# Patient Record
Sex: MALE | Race: AMERICAN INDIAN OR ALASKA NATIVE
[De-identification: names, ages, dates, MRNs, and addresses within clinical notes are randomized per-mention and may not be internally consistent; named-entity substitution may affect disease eponyms.]

---

## 2018-01-05 ENCOUNTER — HOSPITAL ENCOUNTER (EMERGENCY)
Dept: HOSPITAL 5 - ED | Age: 68
LOS: 1 days | Discharge: HOME | End: 2018-01-06
Payer: MEDICARE
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
## 2018-01-05 DIAGNOSIS — S06.0X9A: Primary | ICD-10-CM
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
## 2018-01-05 DIAGNOSIS — I10: ICD-10-CM
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
## 2018-01-05 DIAGNOSIS — S06.5X9A: ICD-10-CM

## 2018-01-05 LAB
BASOPHILS # (AUTO): 0 K/MM3 (ref 0–0.1)
BASOPHILS NFR BLD AUTO: 0.3 % (ref 0–1.8)
BILIRUB UR QL STRIP: (no result)
BLOOD UR QL VISUAL: (no result)
BUN SERPL-MCNC: 26 MG/DL (ref 9–20)
BUN/CREAT SERPL: 24 %
CALCIUM SERPL-MCNC: 9.1 MG/DL (ref 8.4–10.2)
EOSINOPHIL # BLD AUTO: 0 K/MM3 (ref 0–0.4)
EOSINOPHIL NFR BLD AUTO: 0 % (ref 0–4.3)
HCT VFR BLD CALC: 40.1 % (ref 35.5–45.6)
HEMOLYSIS INDEX: 32
HGB BLD-MCNC: 12.9 GM/DL (ref 11.8–15.2)
LYMPHOCYTES # BLD AUTO: 1 K/MM3 (ref 1.2–5.4)
LYMPHOCYTES NFR BLD AUTO: 8.7 % (ref 13.4–35)
MCH RBC QN AUTO: 27 PG (ref 28–32)
MCHC RBC AUTO-ENTMCNC: 32 % (ref 32–34)
MCV RBC AUTO: 85 FL (ref 84–94)
MONOCYTES # (AUTO): 0.7 K/MM3 (ref 0–0.8)
MONOCYTES % (AUTO): 5.5 % (ref 0–7.3)
NITRITE UR QL STRIP: (no result)
PH UR STRIP: 6 [PH] (ref 5–7)
PLATELET # BLD: 201 K/MM3 (ref 140–440)
PROT UR STRIP-MCNC: (no result) MG/DL
RBC # BLD AUTO: 4.74 M/MM3 (ref 3.65–5.03)
RBC #/AREA URNS HPF: 1 /HPF (ref 0–6)
UROBILINOGEN UR-MCNC: < 2 MG/DL (ref ?–2)
WBC #/AREA URNS HPF: < 1 /HPF (ref 0–6)

## 2018-01-05 PROCEDURE — 70450 CT HEAD/BRAIN W/O DYE: CPT

## 2018-01-05 PROCEDURE — 72040 X-RAY EXAM NECK SPINE 2-3 VW: CPT

## 2018-01-05 PROCEDURE — 96375 TX/PRO/DX INJ NEW DRUG ADDON: CPT

## 2018-01-05 PROCEDURE — 85025 COMPLETE CBC W/AUTO DIFF WBC: CPT

## 2018-01-05 PROCEDURE — 93010 ELECTROCARDIOGRAM REPORT: CPT

## 2018-01-05 PROCEDURE — 81001 URINALYSIS AUTO W/SCOPE: CPT

## 2018-01-05 PROCEDURE — 99285 EMERGENCY DEPT VISIT HI MDM: CPT

## 2018-01-05 PROCEDURE — 96365 THER/PROPH/DIAG IV INF INIT: CPT

## 2018-01-05 PROCEDURE — 93005 ELECTROCARDIOGRAM TRACING: CPT

## 2018-01-05 PROCEDURE — 36415 COLL VENOUS BLD VENIPUNCTURE: CPT

## 2018-01-05 PROCEDURE — 82962 GLUCOSE BLOOD TEST: CPT

## 2018-01-05 PROCEDURE — 80048 BASIC METABOLIC PNL TOTAL CA: CPT

## 2018-01-05 NOTE — CAT SCAN REPORT
FINAL REPORT



PROCEDURE:  CT HEAD/BRAIN WO CON



TECHNIQUE:  Computerized tomography of the head was performed

without contrast material. 



HISTORY:  head injury 



COMPARISON:  No prior studies are available for comparison.



FINDINGS:  

There is extensive bilateral periventricular white matter low

attenuation, which may be related to chronic microvascular

ischemic changes. There are chronic appearing bilateral thalamic

lacunar infarcts. There is no CT evidence of intracranial mass,

acute territorial infarction, or hydrocephalus. The intracranial

arteries are symmetric in density. 



There is mild asymmetric increased density along the right

tentorium, which may be incidental. If there has been significant

head trauma, trace subdural hemorrhage could have this

appearance. No mass effect. 



IMPRESSION:  

Questionable trace subdural hemorrhage along the right tentorium,

if there has been acute head trauma. This may be artifactual or

related to chronic asymmetric appearance. Follow-up CT could be

obtained as indicated. Findings were discussed with SAEED Avitia by telephone at 9:15 p.m.   central standard time on

01/05/2018 



Extensive chronic microvascular ischemic changes.

## 2018-01-05 NOTE — XRAY REPORT
FINAL REPORT



PROCEDURE:  XR SPINE CERVICAL 2-3V



TECHNIQUE:  Cervical spine complete, including AP, lateral,

open-mouth odontoid, oblique and flexion and extension studies.

CPT 81144







HISTORY:  fall 



COMPARISON:  No prior studies are available for comparison.



FINDINGS:  

Prevertebral soft tissues: Normal .



Alignment in neutral position: Normal .



Vertebral body movement with flexion and extension: Physiologic .



Vertebral body heights/Disk spaces: There are multilevel

degenerative disc changes with ventral osteophytic ridging..



Fracture(s): None .



Neural foramina: Normal .



Facets: Normal .



Bone mineralization: Normal .







IMPRESSION:  

There is no fracture or malalignment.

## 2018-01-05 NOTE — EMERGENCY DEPARTMENT REPORT
ED Syncope HPI





- General


Chief Complaint: Syncope


Stated Complaint: SYNCOPE


Time Seen by Provider: 01/05/18 22:35


Source: patient, family


Exam Limitations: no limitations





- History of Present Illness


Initial Comments: 





Patient is a 67-year-old Afro-American male who is presenting status post 

closed head injury.  Patient's family states that he has issues with his 

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 

left-sided weakness.  Patient was walking up some steps and slipped and fell 

backwards and hit the back of his head.  Patient did have a syncopal episode 

after falling patient was unconscious for approximately 5-10 minutes.  Patient 

was aroused by time EMS arrived.  Patient is complaining of generalized 

headache mostly posterior.  Patient is not having any new weakness at this 

time.  Patient denies nausea vomiting diarrhea chest pain shortness of breath.  

Patient did see his primary care physician several days ago patient's blood 

pressure was 180/100 but his blood pressure medicines were not changed at that 

time.  He states the headache is a 7 out of 10 in severity and aching


Precipitating Factors: Positive: none.  Negative: confusion, lightheadedness, 

rapid heart beat





- Related Data


Allergies/Adverse Reactions: 


Allergies





No Known Allergies Allergy (Unverified 01/05/18 21:04)


 








Home Medications: 


Ambulatory Orders





HYDROcodone/APAP 5-325 [[place] 5/325] 1 each PO Q4HR PRN #12 tablet 01/06/18 











ED Review of Systems


ROS: 


Stated complaint: SYNCOPE


Other details as noted in HPI





Comment: All other systems reviewed and negative





ED Past Medical Hx





- Past Medical History


Hx Hypertension: Yes


Hx CVA: Yes


Additional medical history: Gout





- Surgical History


Additional Surgical History: Right ankle Hardware





- Social History


Smoking Status: Never Smoker


Substance Use Type: None





- Medications


Home Medications: 


 Home Medications











 Medication  Instructions  Recorded  Confirmed  Last Taken  Type


 


HYDROcodone/APAP 5-325 [[place] 1 each PO Q4HR PRN #12 tablet 01/06/18  Unknown Rx





5/325]     














ED Physical Exam





- General


Limitations: No Limitations


General appearance: alert, in no apparent distress





- Head


Head exam: Present: atraumatic, normocephalic





- Eye


Eye exam: Present: normal appearance





- ENT


ENT exam: Present: mucous membranes moist





- Neck


Neck exam: Present: normal inspection





- Respiratory


Respiratory exam: Present: normal lung sounds bilaterally.  Absent: respiratory 

distress





- Cardiovascular


Cardiovascular Exam: Present: regular rate, normal rhythm.  Absent: systolic 

murmur, diastolic murmur, rubs, gallop





- GI/Abdominal


GI/Abdominal exam: Present: soft, normal bowel sounds





- Rectal


Rectal exam: Present: deferred





- Extremities Exam


Extremities exam: Present: normal inspection





- Back Exam


Back exam: Present: normal inspection





- Neurological Exam


Neurological exam: Present: alert, oriented X3





- Psychiatric


Psychiatric exam: Present: normal affect, normal mood





- Skin


Skin exam: Present: warm, dry, intact, normal color.  Absent: rash





ED Course


 Vital Signs











  01/05/18 01/05/18 01/05/18





  20:00 20:56 22:46


 


Temperature 98.0 F 98 F 


 


Pulse Rate 58 L 58 L 60


 


Respiratory 20 20 14





Rate   


 


Blood Pressure 212/104 212/104 180/94


 


Blood Pressure   





[Right]   


 


O2 Sat by Pulse 97 97 100





Oximetry   














  01/05/18 01/05/18 01/05/18





  22:55 23:08 23:12


 


Temperature   


 


Pulse Rate  59 L 59 L


 


Respiratory 16 17 





Rate   


 


Blood Pressure  178/92 171/96


 


Blood Pressure   





[Right]   


 


O2 Sat by Pulse 99 99 





Oximetry   














  01/05/18 01/05/18 01/05/18





  23:15 23:20 23:30


 


Temperature  97.7 F 


 


Pulse Rate  59 L 59 L


 


Respiratory 27 H 16 10 L





Rate   


 


Blood Pressure 164/102  164/102


 


Blood Pressure  171/96 





[Right]   


 


O2 Sat by Pulse 99 99 96





Oximetry   














  01/05/18 01/05/18 01/06/18





  23:32 23:46 00:00


 


Temperature   


 


Pulse Rate  58 L 58 L


 


Respiratory 16 11 L 11 L





Rate   


 


Blood Pressure  150/90 130/81


 


Blood Pressure   





[Right]   


 


O2 Sat by Pulse  96 97





Oximetry   














  01/06/18 01/06/18 01/06/18





  00:02 00:15 00:30


 


Temperature   


 


Pulse Rate  55 L 58 L


 


Respiratory 16 11 L 10 L





Rate   


 


Blood Pressure  122/74 122/74


 


Blood Pressure   





[Right]   


 


O2 Sat by Pulse  97 96





Oximetry   














  01/06/18 01/06/18 01/06/18





  00:46 01:24 01:30


 


Temperature   


 


Pulse Rate 57 L 56 L 55 L


 


Respiratory 10 L 14 11 L





Rate   


 


Blood Pressure 123/71 114/72 132/78


 


Blood Pressure   





[Right]   


 


O2 Sat by Pulse 96 99 97





Oximetry   














  01/06/18 01/06/18 01/06/18





  01:45 02:00 02:15


 


Temperature   


 


Pulse Rate 53 L 54 L 


 


Respiratory 17 13 12





Rate   


 


Blood Pressure 120/76 120/76 112/73


 


Blood Pressure   





[Right]   


 


O2 Sat by Pulse 95 98 98





Oximetry   














  01/06/18





  02:30


 


Temperature 


 


Pulse Rate 58 L


 


Respiratory 13





Rate 


 


Blood Pressure 112/73


 


Blood Pressure 





[Right] 


 


O2 Sat by Pulse 99





Oximetry 














ED Medical Decision Making





- Lab Data


Result diagrams: 


 01/05/18 21:39





 01/05/18 21:39





- EKG Data


-: EKG Interpreted by Me


EKG shows normal: sinus rhythm, axis, intervals, QRS complexes, ST-T waves


Rate: normal





- EKG Data


Interpretation: normal EKG





- Radiology Data


Radiology results: report reviewed, image reviewed





 CT scan of the head #1 which was signed and 1818: Impression questionable 

trace subdural hemorrhage along the right tentorium.  This may be artifactual 

or related to chronic asymmetric appearance.  Follow-up CT should be obtained.  

Findings were discussed with charge nurse cold and 2115.


CT scan of the head #2: Questionable right tentorial subdural hematomas again 

identified and unchanged there is no intra-axial hemorrhage.  There is no edema 

and mass effect or midline shift this is a 6 hour post study





- Medical Decision Making





Patient is a 67-year-old American male who presents with headache after a fall 

and syncopal episode.  Patient was noted on CT scan to have a questionable 

subdural hematoma that was sub CM. the patient's blood pressure was treated 

with pain relief and 5 of hydralazine.  Patient will follow with his physician 

regarding his blood pressure.  Dr. Patton with neurosurgery a  [facility] 

was consult for possible transfer.  [facility] are all on diversion.  

Dr. Patton states that after 6 hours of the very small subdural which is 

questionable is the same size the patient can be discharged home.  Patient will 

was given follow-up information


Critical Care Time: No


Critical care attestation.: 


If time is entered above; I have spent that time in minutes in the direct care 

of this critically ill patient, excluding procedure time.








ED Disposition


Clinical Impression: 


 Closed head injury, Concussion, Subdural hematoma due to concussion, 

Hypertensive urgency





Disposition: DC-01 TO HOME OR SELFCARE


Is pt being admited?: No


Does the pt Need Aspirin: No


Condition: Stable


Additional Instructions: 


Please follow up with your Dr. PATTON [phone] or your neuologist if you have 

one


Prescriptions: 


HYDROcodone/APAP 5-325 [[place] 5/325] 1 each PO Q4HR PRN #12 tablet


 PRN Reason: Pain


Referrals: 


PK ODELL MD [Primary Care Provider] - 3-5 Days

## 2018-01-06 VITALS — SYSTOLIC BLOOD PRESSURE: 171 MMHG | DIASTOLIC BLOOD PRESSURE: 96 MMHG

## 2018-01-06 NOTE — CAT SCAN REPORT
FINAL REPORT



PROCEDURE:  CT HEAD/BRAIN WO CON



TECHNIQUE:  Computerized tomography of the head was performed

without contrast material. 



HISTORY:  6hr re-eval for subdural hematoma 



COMPARISON:  No prior studies are available for comparison.



FINDINGS:  

Skull and scalp: Normal.



Paranasal sinuses: Normal.



Ventricles and subarachnoid spaces: There is moderate central and

cortical atrophy. There is no hydrocephalus or asymmetry..



Cerebrum: Questionable right tentorial subdural hematoma is again

identified and unchanged. There is no intra-axial hemorrhage.

There is no edema, mass effect or midline shift.. There are old

lacunar infarct defects in the thalami and basal ganglia. There

is chronic periventricular deep white matter ischemic gliosis. 



Cerebellum and brainstem: No evidence of hemorrhage, acute

infarction or mass.



Vasculature: Normal.



Comments: None.



IMPRESSION:  





There is moderate central and cortical atrophy. There is no

hydrocephalus or asymmetry..



Questionable right tentorial subdural hematoma is again

identified and unchanged. There is no intra-axial hemorrhage.

There is no edema, mass effect or midline shift.. 



There are old lacunar infarct defects in the thalami and basal

ganglia. There is chronic periventricular deep white matter

ischemic gliosis.

## 2024-10-30 ENCOUNTER — TELEPHONE ENCOUNTER (OUTPATIENT)
Dept: URBAN - METROPOLITAN AREA CLINIC 6 | Facility: CLINIC | Age: 74
End: 2024-10-30

## 2025-01-13 ENCOUNTER — TELEPHONE ENCOUNTER (OUTPATIENT)
Dept: URBAN - METROPOLITAN AREA CLINIC 6 | Facility: CLINIC | Age: 75
End: 2025-01-13

## 2025-01-29 ENCOUNTER — LAB OUTSIDE AN ENCOUNTER (OUTPATIENT)
Dept: URBAN - METROPOLITAN AREA SURGERY CENTER 23 | Facility: SURGERY CENTER | Age: 75
End: 2025-01-29

## 2025-01-30 ENCOUNTER — OFFICE VISIT (OUTPATIENT)
Dept: URBAN - METROPOLITAN AREA SURGERY CENTER 23 | Facility: SURGERY CENTER | Age: 75
End: 2025-01-30